# Patient Record
Sex: MALE | Race: ASIAN | NOT HISPANIC OR LATINO | Employment: FULL TIME | URBAN - METROPOLITAN AREA
[De-identification: names, ages, dates, MRNs, and addresses within clinical notes are randomized per-mention and may not be internally consistent; named-entity substitution may affect disease eponyms.]

---

## 2021-08-05 ENCOUNTER — HOSPITAL ENCOUNTER (EMERGENCY)
Facility: HOSPITAL | Age: 36
Discharge: HOME | End: 2021-08-05
Admitting: PHYSICIAN ASSISTANT
Payer: COMMERCIAL

## 2021-08-05 VITALS
BODY MASS INDEX: 37.94 KG/M2 | HEIGHT: 70 IN | HEART RATE: 84 BPM | WEIGHT: 265 LBS | SYSTOLIC BLOOD PRESSURE: 143 MMHG | TEMPERATURE: 98.5 F | OXYGEN SATURATION: 98 % | RESPIRATION RATE: 16 BRPM | DIASTOLIC BLOOD PRESSURE: 91 MMHG

## 2021-08-05 DIAGNOSIS — L02.412 ABSCESS OF AXILLA, LEFT: ICD-10-CM

## 2021-08-05 PROCEDURE — 99283 EMERGENCY DEPT VISIT LOW MDM: CPT

## 2021-08-05 RX ORDER — SULFAMETHOXAZOLE/TRIMETHOPRIM 800-160 MG
1 TABLET ORAL 2 TIMES DAILY
Qty: 20 TABLET | Refills: 0 | Status: SHIPPED | OUTPATIENT
Start: 2021-08-05 | End: 2021-08-15

## 2021-08-05 ASSESSMENT — ENCOUNTER SYMPTOMS
HEADACHES: 0
VOMITING: 0
CHILLS: 0
TROUBLE SWALLOWING: 0
SHORTNESS OF BREATH: 0
DYSURIA: 0
FEVER: 0
NAUSEA: 0
WOUND: 1
SORE THROAT: 0
EYE DISCHARGE: 0
ABDOMINAL PAIN: 0
WEAKNESS: 0
BACK PAIN: 0
NUMBNESS: 0
DIARRHEA: 0
FREQUENCY: 0
COUGH: 0
CHEST TIGHTNESS: 0
NECK PAIN: 0
HEMATURIA: 0

## 2021-08-05 ASSESSMENT — MIFFLIN-ST. JEOR: SCORE: 2143.28

## 2021-08-05 NOTE — ED TRIAGE NOTES
"Patient arrives via private vehicle, coming feom home.  Yesterday he noticed \" a pimple\" in his left armpit. It popped but then this morning it is back but larger and painful.  "

## 2021-08-05 NOTE — Clinical Note
Yang Robertson was seen and treated in our emergency department on 8/5/2021.  He may return to work on 08/09/2021.       If you have any questions or concerns, please don't hesitate to call.      Frankie Cordova PA-C

## 2021-08-05 NOTE — ED PROVIDER NOTES
EMERGENCY DEPARTMENT ENCOUNTER      NAME: Yang Robertson  AGE: 36 year old male  YOB: 1985  MRN: [id number]  EVALUATION DATE & TIME: 8/5/2021  9:05 AM    PCP: No primary care provider on file.    ED PROVIDER: Frankie Cordova PA-C      Chief Complaint   Patient presents with     Cyst         FINAL IMPRESSION:  1. Abscess of axilla, left          MEDICAL DECISION MAKING:    Pertinent Labs & Imaging studies reviewed. (See chart for details)  36 year old male presents to the Emergency Department for evaluation of draining abscess left axilla.    After obtaining history present illness, reviewing vitals and examining the patient at this point time I was able to express a significant amount of purulent discharge from the abscess involving the left armpit.  No negation for I&D since there is good drainage at this time.  Patient does have significant tenderness and induration therefore I will start patient antibiotics.  I discussed signs and symptoms for which to return otherwise patient comfortable with discharged home at this time.  Plan to treat the patient with a course of Bactrim.        ED COURSE  9:24 AM I met with the patient, obtained history, performed an initial exam, and discussed options and plan for diagnostics and treatment here in the ED.  9:29 AM I drained the patient's abscess. We discussed the plan for discharge and the patient is agreeable. Reviewed supportive cares, symptomatic treatment, outpatient follow up, and reasons to return to the Emergency Department. Patient to be discharged by ED RN.     At the conclusion of the encounter I discussed the results of all of the tests and the disposition. The questions were answered. The patient or family acknowledged understanding and was agreeable with the care plan.     MEDICATIONS GIVEN IN THE EMERGENCY:  Medications - No data to display    NEW PRESCRIPTIONS STARTED AT TODAY'S ER VISIT  New Prescriptions    SULFAMETHOXAZOLE-TRIMETHOPRIM (BACTRIM  DS) 800-160 MG TABLET    Take 1 tablet by mouth 2 times daily for 10 days            =================================================================    HPI    Patient information was obtained from: Patient    Use of Interpretor: N/A      Yang Robertson is a 36 year old male with a no pertinent history who presents to this ED by walk in for evaluation of abscess.    The patient presents with an abscess in his left armpit. Patient states he first noticed the abscess yesterday when he began to notice pain in the area surrounding the abscess. He initially thought it was a pimple and drained the abscess last night. This morning the abscess had refilled, increased in size, and became more painful. The abscess is actively draining right now. He denies any history of MRSA. He denies any other complaints at this time.    REVIEW OF SYSTEMS   Review of Systems   Constitutional: Negative for chills and fever.   HENT: Negative for congestion, sore throat and trouble swallowing.    Eyes: Negative for discharge and visual disturbance.   Respiratory: Negative for cough, chest tightness and shortness of breath.    Cardiovascular: Negative for chest pain and leg swelling.   Gastrointestinal: Negative for abdominal pain, diarrhea, nausea and vomiting.   Genitourinary: Negative for dysuria, frequency, hematuria and urgency.   Musculoskeletal: Negative for back pain, gait problem and neck pain.   Skin: Positive for wound (abscess, left armpit). Negative for rash.   Neurological: Negative for weakness, numbness and headaches.   Psychiatric/Behavioral: Negative for behavioral problems and suicidal ideas.          PAST MEDICAL HISTORY:  History reviewed. No pertinent past medical history.    PAST SURGICAL HISTORY:  History reviewed. No pertinent surgical history.      CURRENT MEDICATIONS:    No current facility-administered medications for this encounter.    Current Outpatient Medications:      sulfamethoxazole-trimethoprim (BACTRIM DS)  "800-160 MG tablet, Take 1 tablet by mouth 2 times daily for 10 days, Disp: 20 tablet, Rfl: 0      ALLERGIES:  No Known Allergies    FAMILY HISTORY:  History reviewed. No pertinent family history.    SOCIAL HISTORY:   Social History     Socioeconomic History     Marital status: Single     Spouse name: Not on file     Number of children: Not on file     Years of education: Not on file     Highest education level: Not on file   Occupational History     Not on file   Tobacco Use     Smoking status: Not on file   Substance and Sexual Activity     Alcohol use: Not on file     Drug use: Not on file     Sexual activity: Not on file   Other Topics Concern     Not on file   Social History Narrative     Not on file     Social Determinants of Health     Financial Resource Strain:      Difficulty of Paying Living Expenses:    Food Insecurity:      Worried About Running Out of Food in the Last Year:      Ran Out of Food in the Last Year:    Transportation Needs:      Lack of Transportation (Medical):      Lack of Transportation (Non-Medical):    Physical Activity:      Days of Exercise per Week:      Minutes of Exercise per Session:    Stress:      Feeling of Stress :    Social Connections:      Frequency of Communication with Friends and Family:      Frequency of Social Gatherings with Friends and Family:      Attends [employer] Services:      Active Member of Clubs or Organizations:      Attends Club or Organization Meetings:      Marital Status:    Intimate Partner Violence:      Fear of Current or Ex-Partner:      Emotionally Abused:      Physically Abused:      Sexually Abused:        VITALS:  Patient Vitals for the past 24 hrs:   BP Temp Temp src Pulse Resp SpO2 Height Weight   08/05/21 0846 (!) 143/91 98.5  F (36.9  C) Oral 84 16 98 % 1.778 m (5' 10\") 120.2 kg (265 lb)       PHYSICAL EXAM    Physical Exam  Constitutional:       General: He is not in acute distress.     Appearance: Normal appearance. He is normal weight. He " is not toxic-appearing or diaphoretic.   HENT:      Head: Normocephalic and atraumatic.      Right Ear: External ear normal.      Left Ear: External ear normal.      Nose: Nose normal.   Eyes:      Extraocular Movements: Extraocular movements intact.      Conjunctiva/sclera: Conjunctivae normal.   Pulmonary:      Breath sounds: Normal breath sounds.   Musculoskeletal:         General: No swelling, deformity or signs of injury. Normal range of motion.   Skin:     Comments: There is a area of induration involving the left axilla approximate diameter of 3 cm.  With palpation in this area cloudy purulent discharge does exude from what is likely an abscess.  No significant erythema or warmth but again the area is quite tender.   Neurological:      General: No focal deficit present.      Mental Status: He is alert.   Psychiatric:         Mood and Affect: Mood normal.         Behavior: Behavior normal.          LAB:  All pertinent labs reviewed and interpreted.       RADIOLOGY:  Reviewed all pertinent imaging. Please see official radiology report.  No orders to display       aSrmad MANZANO, am serving as a scribe to document services personally performed by Frankie Cordova PA-C based on my observation and the provider's statements to me. Frankie MANZANO PA-C attest that Sarmad Hurst is acting in a scribe capacity, has observed my performance of the services and has documented them in accordance with my direction.    Frankie Cordova PA-C  Emergency Medicine  [facility]     Frankie Cordova PA-C  08/05/21 0959

## 2024-09-30 ENCOUNTER — ANCILLARY PROCEDURE (OUTPATIENT)
Dept: ULTRASOUND IMAGING | Facility: HOSPITAL | Age: 39
End: 2024-09-30
Attending: EMERGENCY MEDICINE
Payer: COMMERCIAL

## 2024-09-30 ENCOUNTER — HOSPITAL ENCOUNTER (EMERGENCY)
Facility: HOSPITAL | Age: 39
Discharge: HOME | End: 2024-09-30
Attending: EMERGENCY MEDICINE | Admitting: EMERGENCY MEDICINE
Payer: COMMERCIAL

## 2024-09-30 VITALS
BODY MASS INDEX: 38.74 KG/M2 | OXYGEN SATURATION: 96 % | WEIGHT: 270 LBS | RESPIRATION RATE: 18 BRPM | HEART RATE: 74 BPM | TEMPERATURE: 99.1 F | SYSTOLIC BLOOD PRESSURE: 156 MMHG | DIASTOLIC BLOOD PRESSURE: 89 MMHG

## 2024-09-30 DIAGNOSIS — L02.413 ABSCESS OF ARM, RIGHT: ICD-10-CM

## 2024-09-30 PROCEDURE — 76882 US LMTD JT/FCL EVL NVASC XTR: CPT | Mod: RT

## 2024-09-30 PROCEDURE — 250N000013 HC RX MED GY IP 250 OP 250 PS 637

## 2024-09-30 PROCEDURE — 99284 EMERGENCY DEPT VISIT MOD MDM: CPT | Mod: 25

## 2024-09-30 PROCEDURE — 10060 I&D ABSCESS SIMPLE/SINGLE: CPT | Mod: RT

## 2024-09-30 RX ORDER — IBUPROFEN 600 MG/1
600 TABLET, FILM COATED ORAL ONCE
Status: COMPLETED | OUTPATIENT
Start: 2024-09-30 | End: 2024-09-30

## 2024-09-30 RX ORDER — SULFAMETHOXAZOLE/TRIMETHOPRIM 800-160 MG
1 TABLET ORAL 2 TIMES DAILY
Qty: 14 TABLET | Refills: 0 | Status: SHIPPED | OUTPATIENT
Start: 2024-09-30 | End: 2024-10-07

## 2024-09-30 RX ADMIN — IBUPROFEN 600 MG: 600 TABLET, FILM COATED ORAL at 10:22

## 2024-09-30 ASSESSMENT — COLUMBIA-SUICIDE SEVERITY RATING SCALE - C-SSRS
1. IN THE PAST MONTH, HAVE YOU WISHED YOU WERE DEAD OR WISHED YOU COULD GO TO SLEEP AND NOT WAKE UP?: NO
6. HAVE YOU EVER DONE ANYTHING, STARTED TO DO ANYTHING, OR PREPARED TO DO ANYTHING TO END YOUR LIFE?: NO
2. HAVE YOU ACTUALLY HAD ANY THOUGHTS OF KILLING YOURSELF IN THE PAST MONTH?: NO

## 2024-09-30 ASSESSMENT — ACTIVITIES OF DAILY LIVING (ADL): ADLS_ACUITY_SCORE: 35

## 2024-09-30 NOTE — ED PROVIDER NOTES
I, Antonina Valverde have reviewed the documentation, personally taken the patient's history, performed an exam and agree with the physical finds, diagnosis and management plan.    HPI:  40 y/o M R upper arm w swelling/reddness worse x4d. Poked w needle yest, didn't get anything out. Similar episode prev came to head and started draining on its own.    Physical Exam: On examination there is an area of erythema, swelling, warmth and induration with questionable fluctuance to the right inner bicep region    MDM: Abscess versus cellulitis    ED Course/workup: Ultrasound confirms small cobblestoning consistent with abscess.  Incision and drainage performed, home with antibiotics.    POC US SOFT TISSUE    Date/Time: 9/30/2024 10:26 AM    Performed by: Antonina Valverde MD  Authorized by: Antonina Valverde MD    Procedure Details & Findings:      Limited soft tissue ultrasound of the right upper extremity performed in area of interest with cobblestoning consistent with small abscess.               Final Diagnosis:     ICD-10-CM    1. Abscess of arm, right  L02.413               I personally saw the patient and performed a substantive portion of the visit including all aspects of the medical decision making.  I personally made/approved the management plan and take responsibility for the patient management.     MD Abram Mccoy Zabrina N, MD  09/30/24 1026

## 2024-09-30 NOTE — DISCHARGE INSTRUCTIONS
You were seen in the emergency department for evaluation of pain in the right arm.  You had an abscess which I drained for you in the emergency department.  I prescribed you a antibiotic called Bactrim to take for the next 7 days.  Please take this as prescribed.    Your blood pressure was high in the emergency department.  Please get this rechecked at your primary care appointment tomorrow.    Return to the emergency department for redness spreading down the arm, up the shoulder, fever, or any other concerning symptoms.

## 2024-09-30 NOTE — ED TRIAGE NOTES
Area of swelling noted to right bicep starting 09/26.  Appears red and warm to touch.  C/o burning sensation.  States similar episode to other arm in the past which required I&D.  Denies any hx of diabetes.      Triage Assessment (Adult)       Row Name 09/30/24 0986          Triage Assessment    Airway WDL WDL        Respiratory WDL    Respiratory WDL WDL        Skin Circulation/Temperature WDL    Skin Circulation/Temperature WDL X  swelling noted to right bicep- warm to touch. burning sensation        Cardiac WDL    Cardiac WDL WDL        Peripheral/Neurovascular WDL    Peripheral Neurovascular WDL WDL        Cognitive/Neuro/Behavioral WDL    Cognitive/Neuro/Behavioral WDL WDL

## 2024-09-30 NOTE — ED PROVIDER NOTES
Emergency Department Encounter   NAME: Yang Robertson  AGE: 39 year old male  YOB: 1985  MRN: [id number]    PCP: No Ref-Primary, Physician  ED PROVIDER: Brigette Wolf PA-C    Evaluation Date & Time:   9/30/2024  9:45 AM    CHIEF COMPLAINT:  Abscess      Impression and Plan   MDM: 39-year-old male with no pertinent history presents for evaluation of pain and swelling in the right upper arm.  Over the last 5 days has increased.  No fever or chills.  Had similar thing years ago which needed drainage and antibiotics.  On arrival here patient is hypertensive at 184/94, otherwise vitally stable.  Afebrile.  On examination patient is in no acute distress.  There is an area on the right upper medial bicep which is erythematous, indurated, with a small area of fluctuance.  There is no crepitus in this area to suggest necrotic process.  There is no lymphangitis.  Patient is not toxic appearing and has reassuring vitals, I have low suspicion for systemic illness and do not think that any labs are indicated at this time.    Your ultrasound was done with Dr. Diehl which showed cobblestoning consistent with cellulitis as well as a small fluid collection underneath the skin.  I discussed plan for incision and drainage which the patient for abscess and discharged home with Bactrim given overlying erythema.  Patient is agreeable with this plan.  Ibuprofen at this time for pain.    Incision and drainage was completed as below.  I was able to express a large amount of bloody purulence.  Patient tolerated the procedure well.  Wound was dressed.  We discussed warm compresses to continue to encourage drainage over the next several days.  I prescribed him Bactrim.  Discussed use and side effects of this medication.  Reviewed his hypertension here.  This is improved from his arrival in the 180s to 156/89.  No history of hypertension.  He is asymptomatic without any chest pain, shortness of breath, vision changes, headache, or  changes in urination.  I do not think that any workup is needed for this here in the emergency department.  Thankfully patient has appointment with his primary care provider tomorrow where he will get a blood pressure check.  We also discussed having his wound assessed with a primary care provider tomorrow.  We discussed signs of worsening infection.  He will return to the emergency department if he develops fever, redness that is spreading, or any other concerning symptoms.  We reviewed strict return precautions and patient was discharged home in stable condition.    I have staffed the patient with Dr. Valverde, ED physician, who will evaluate the patient and agrees with all aspects of today's care.          Medical Decision Making  Obtained supplemental history:Supplemental history obtained?: No  Reviewed external records: External records reviewed?: No  Care impacted by chronic illness:Documented in Chart  Care significantly affected by social determinants of health:N/A  Did you consider but not order tests?: Work up considered but not performed and documented in chart, if applicable  Did you interpret images independently?: Independent interpretation of ECG and images noted in documentation, when applicable.  Consultation discussion with other provider:Did you involve another provider (consultant, , pharmacy, etc.)?: No  Discharge. I prescribed additional prescription strength medication(s) as charted. N/A.   At the conclusion of the encounter I discussed the results of all the tests and the disposition. The questions were answered. The patient or family acknowledged understanding and was agreeable with the care plan.    Not Applicable       FINAL IMPRESSION:    ICD-10-CM    1. Abscess of arm, right  L02.413             MEDICATIONS GIVEN IN THE EMERGENCY DEPARTMENT:  Medications   ibuprofen (ADVIL/MOTRIN) tablet 600 mg (600 mg Oral $Given 9/30/24 1022)         NEW PRESCRIPTIONS STARTED AT TODAY'S ED  VISIT:  Discharge Medication List as of 9/30/2024 10:51 AM        START taking these medications    Details   sulfamethoxazole-trimethoprim (BACTRIM DS) 800-160 MG tablet Take 1 tablet by mouth 2 times daily for 7 days., Disp-14 tablet, R-0, E-Prescribe               HPI   Patient information was obtained from: patient   Use of Intrepreter: N/A     Yang Robertson is a 39 year old male with no pertinent history who presents to the ED by car for evaluation of abscess.  Over the last 4 days patient has noticed a growing area of redness, swelling, and pain on the inside of his right upper arm.  He denies any trauma to the area.  Yesterday tried to poke it with a needle to see if anything came out but only a small amount of blood came out.  He denies any fever or chills.  Says that the same thing happened a couple of years ago and it started draining on its own and he was placed on antibiotics.  Denies any other lumps anywhere else.  No numbness or tingling of the arm.    Per chart review, patient was seen in the [facility] emergency room on 8/5/2021.  He had an actively draining abscess on exam.  Was sent home with Bactrim and PCP follow-up.    REVIEW OF SYSTEMS:  Pertinent positive and negative symptoms per HPI.       Medical History     No past medical history on file.    No past surgical history on file.    No family history on file.         sulfamethoxazole-trimethoprim (BACTRIM DS) 800-160 MG tablet          Physical Exam     First Vitals:  Patient Vitals for the past 24 hrs:   BP Temp Temp src Pulse Resp SpO2 Weight   09/30/24 1053 -- -- -- 74 -- 96 % --   09/30/24 1052 (!) 156/89 -- -- -- -- -- --   09/30/24 0957 -- 99.1  F (37.3  C) Oral -- -- -- --   09/30/24 0945 (!) 184/94 -- -- 80 18 96 % 122.5 kg (270 lb)       PHYSICAL EXAM:   General Appearance:  Alert, cooperative, no distress, appears stated age  Respiratory: No distress. Lungs clear to ausculation bilaterally. No wheezes, rhonchi or  stridor  Cardiovascular: Regular rate and rhythm, no murmur. Normal cap refill. No peripheral edema  GI: Abdomen soft, nontender, normal bowel sounds  : No CVA tenderness  Musculoskeletal: Moving all extremities. No gross deformities.  Full range of motion of the right shoulder, elbow, wrist.  Integument: Warm, dry.  Right upper medial bicep with area of erythema, induration, and fluctuance.  Approximately 3 x 3 cm.  No underlying crepitus.  No active drainage.  Neurologic: Alert and orientated x3.   Psych: Normal mood and affect      Results     LAB:  All pertinent labs reviewed and interpreted  Labs Ordered and Resulted from Time of ED Arrival to Time of ED Departure - No data to display    RADIOLOGY:  POC US SOFT TISSUE   Final Result          PROCEDURES:  PROCEDURE: Incision and Drainage   INDICATIONS: Localized abscess   PROCEDURE PROVIDER: Brigette Wolf PA-C   SITE: R medial bicep   MEDICATION: 8 mLs of 1% Lidocaine with epinephrine   NOTE: The area was prepped with chlorhexidine and draped off in the usual sterile fashion.  Local anesthetic was injected subcutaneously with anesthesia effects demonstrated prior to proceeding.  The area of maximal fluctuance was opened with a # 11 Blade ([place]) using a Single Straight incision to allow for drainage.  The abscess was drained.  The abscess cavity was bluntly explored to separate any loculations. No Packing was placed into the abscess cavity.  A sterile dressing was placed over the area.   COMPLEXITY: Simple    Simple = single, furuncle, paronychia, superficial  Complex = multiple or abscess requiring probing, loculations, packing placement   COMPLICATIONS: Patient tolerated procedure well, without complication           Brigette Wolf PA-C   Emergency Medicine   [facility] EMERGENCY DEPARTMENT       Brigette Wolf PA-C  09/30/24 0097